# Patient Record
Sex: MALE | Race: WHITE | Employment: FULL TIME | URBAN - METROPOLITAN AREA
[De-identification: names, ages, dates, MRNs, and addresses within clinical notes are randomized per-mention and may not be internally consistent; named-entity substitution may affect disease eponyms.]

---

## 2017-05-18 ENCOUNTER — APPOINTMENT (OUTPATIENT)
Dept: OCCUPATIONAL MEDICINE | Age: 62
End: 2017-05-18
Attending: EMERGENCY MEDICINE

## 2019-02-22 ENCOUNTER — OFFICE VISIT (OUTPATIENT)
Dept: FAMILY MEDICINE CLINIC | Facility: CLINIC | Age: 64
End: 2019-02-22

## 2019-02-22 VITALS
OXYGEN SATURATION: 98 % | SYSTOLIC BLOOD PRESSURE: 116 MMHG | RESPIRATION RATE: 16 BRPM | TEMPERATURE: 98 F | HEART RATE: 72 BPM | DIASTOLIC BLOOD PRESSURE: 78 MMHG

## 2019-02-22 DIAGNOSIS — J01.00 ACUTE NON-RECURRENT MAXILLARY SINUSITIS: Primary | ICD-10-CM

## 2019-02-22 PROCEDURE — 99202 OFFICE O/P NEW SF 15 MIN: CPT | Performed by: NURSE PRACTITIONER

## 2019-02-22 RX ORDER — AMOXICILLIN AND CLAVULANATE POTASSIUM 875; 125 MG/1; MG/1
1 TABLET, FILM COATED ORAL 2 TIMES DAILY
Qty: 20 TABLET | Refills: 0 | Status: SHIPPED | OUTPATIENT
Start: 2019-02-22 | End: 2019-03-04

## 2019-02-22 NOTE — PROGRESS NOTES
CHIEF COMPLAINT:   \" Patient presents with:  Sinus Problem    HPI:   Homar Calix is a 60year old male who presents with Frontal and Maxillary sinus congestion and pressure. Pt has been blowing out thick yellow and green secretions and has PND.   Ears conjunctiva clear, EOM intact  EARS: TM's are fluid-filled, bulging and non-injected, bilaterally  NOSE: No exudates, nasal mucosa is erythematous and swollen.   Tender Maxillary sinuses  THROAT: Oral mucosa pink, moist. Posterior pharynx is mildly erythema

## 2019-08-05 PROCEDURE — 87086 URINE CULTURE/COLONY COUNT: CPT | Performed by: FAMILY MEDICINE

## 2019-09-28 ENCOUNTER — OFFICE VISIT (OUTPATIENT)
Dept: FAMILY MEDICINE CLINIC | Facility: CLINIC | Age: 64
End: 2019-09-28
Payer: COMMERCIAL

## 2019-09-28 VITALS
TEMPERATURE: 98 F | RESPIRATION RATE: 16 BRPM | DIASTOLIC BLOOD PRESSURE: 76 MMHG | SYSTOLIC BLOOD PRESSURE: 110 MMHG | HEART RATE: 68 BPM

## 2019-09-28 DIAGNOSIS — J01.40 ACUTE NON-RECURRENT PANSINUSITIS: Primary | ICD-10-CM

## 2019-09-28 PROCEDURE — 99213 OFFICE O/P EST LOW 20 MIN: CPT | Performed by: NURSE PRACTITIONER

## 2019-09-28 RX ORDER — AMOXICILLIN AND CLAVULANATE POTASSIUM 875; 125 MG/1; MG/1
1 TABLET, FILM COATED ORAL 2 TIMES DAILY
Qty: 20 TABLET | Refills: 0 | OUTPATIENT
Start: 2019-09-28 | End: 2019-10-08

## 2019-09-28 NOTE — PROGRESS NOTES
CHIEF COMPLAINT:   \" Patient presents with:  Sinus Problem    HPI:   Charito Vidales is a 60year old male who presents with a hx of Frontal and Maxillary sinus congestion and pressure for the past 2 weeks.   Pt has been blowing out thick yellow secretions non-tender  LUNGS: clear to auscultation bilaterally, no wheezes or rhonchi. Breathing is non labored. CARDIO: RRR without murmur  LYMPH:  No head/neck lymphadenopathy.       ASSESSMENT AND PLAN:   Wilbert Mai is a 60year old male who presents with

## 2021-04-19 ENCOUNTER — IMMUNIZATION (OUTPATIENT)
Dept: LAB | Facility: HOSPITAL | Age: 66
End: 2021-04-19
Attending: HOSPITALIST
Payer: COMMERCIAL

## 2021-04-19 DIAGNOSIS — Z23 NEED FOR VACCINATION: Primary | ICD-10-CM

## 2021-04-19 PROCEDURE — 0012A SARSCOV2 VAC 100MCG/0.5ML IM: CPT

## 2021-05-31 PROBLEM — Z85.51 HISTORY OF BLADDER CANCER: Status: ACTIVE | Noted: 2021-05-31

## 2021-05-31 PROBLEM — D49.4 BLADDER NEOPLASM: Status: ACTIVE | Noted: 2021-05-31

## (undated) NOTE — Clinical Note
Dear Dr. Santoyo Sees you for referring Greyson Valentine to the [facility] in [place]. Renu Henao, NP

## (undated) NOTE — Clinical Note
Dear Dr. Dl kahn for referring Mr. Arcelia Arenas to the [facility] in [facility]. Davonte Beckham NP